# Patient Record
Sex: FEMALE | Race: OTHER | HISPANIC OR LATINO | URBAN - METROPOLITAN AREA
[De-identification: names, ages, dates, MRNs, and addresses within clinical notes are randomized per-mention and may not be internally consistent; named-entity substitution may affect disease eponyms.]

---

## 2020-01-22 ENCOUNTER — EMERGENCY (EMERGENCY)
Facility: HOSPITAL | Age: 45
LOS: 1 days | Discharge: HOME | End: 2020-01-22
Admitting: EMERGENCY MEDICINE
Payer: SELF-PAY

## 2020-01-22 VITALS
SYSTOLIC BLOOD PRESSURE: 138 MMHG | HEART RATE: 71 BPM | DIASTOLIC BLOOD PRESSURE: 91 MMHG | RESPIRATION RATE: 16 BRPM | OXYGEN SATURATION: 99 % | TEMPERATURE: 98 F

## 2020-01-22 DIAGNOSIS — Z98.89 OTHER SPECIFIED POSTPROCEDURAL STATES: Chronic | ICD-10-CM

## 2020-01-22 LAB
APPEARANCE UR: CLEAR — SIGNIFICANT CHANGE UP
BACTERIA # UR AUTO: NEGATIVE — SIGNIFICANT CHANGE UP
BILIRUB UR-MCNC: NEGATIVE — SIGNIFICANT CHANGE UP
BLOOD UR QL VISUAL: SIGNIFICANT CHANGE UP
COLOR SPEC: YELLOW — SIGNIFICANT CHANGE UP
GLUCOSE UR-MCNC: NEGATIVE — SIGNIFICANT CHANGE UP
HYALINE CASTS # UR AUTO: NEGATIVE — SIGNIFICANT CHANGE UP
KETONES UR-MCNC: NEGATIVE — SIGNIFICANT CHANGE UP
LEUKOCYTE ESTERASE UR-ACNC: SIGNIFICANT CHANGE UP
NITRITE UR-MCNC: NEGATIVE — SIGNIFICANT CHANGE UP
PH UR: 6.5 — SIGNIFICANT CHANGE UP (ref 5–8)
PROT UR-MCNC: NEGATIVE — SIGNIFICANT CHANGE UP
RBC CASTS # UR COMP ASSIST: SIGNIFICANT CHANGE UP (ref 0–?)
SP GR SPEC: 1.02 — SIGNIFICANT CHANGE UP (ref 1–1.04)
SQUAMOUS # UR AUTO: SIGNIFICANT CHANGE UP
UROBILINOGEN FLD QL: NORMAL — SIGNIFICANT CHANGE UP
WBC UR QL: HIGH (ref 0–?)

## 2020-01-22 PROCEDURE — 99283 EMERGENCY DEPT VISIT LOW MDM: CPT

## 2020-01-22 RX ORDER — CEPHALEXIN 500 MG
500 CAPSULE ORAL ONCE
Refills: 0 | Status: COMPLETED | OUTPATIENT
Start: 2020-01-22 | End: 2020-01-22

## 2020-01-22 RX ORDER — FLUCONAZOLE 150 MG/1
150 TABLET ORAL ONCE
Refills: 0 | Status: COMPLETED | OUTPATIENT
Start: 2020-01-22 | End: 2020-01-22

## 2020-01-22 RX ORDER — CEPHALEXIN 500 MG
1 CAPSULE ORAL
Qty: 13 | Refills: 0
Start: 2020-01-22 | End: 2020-01-28

## 2020-01-22 NOTE — ED PROVIDER NOTE - NSFOLLOWUPINSTRUCTIONS_ED_ALL_ED_FT
Follow up with your Primary Medical Doctor in 1-2 days.  Follow up with your OB/GYN in 1-2 days.  Rest.  Drink plenty of fluids.  Take Keflex 500mg orally 2 x a day x 7days.  Return to the ER for any persistent/worsening or new symptoms fevers, chills, nausea, vomiting or any concerning symptoms.

## 2020-01-22 NOTE — ED PROVIDER NOTE - OBJECTIVE STATEMENT
45 y/o female with a hx of migraines presents to the ER c/o 4 days of dysuria and frequency.  Pt reports 1 week of vaginal itching and whitish discharge.  Pt denies fevers, chills, nausea, vomiting, back pain.  Pt reports hx of yeast infections in the past and states it feels similar to previous episodes.  Pt is not sexually active and denies concern for STDs.  Pt offered and declined translation services requesting her daughter Tiffany Pacheco to translate.

## 2020-01-22 NOTE — ED PROVIDER NOTE - PATIENT PORTAL LINK FT
You can access the FollowMyHealth Patient Portal offered by [facility] by registering at the following website: [email or web address]. By joining [facility]’s FollowMyHealth portal, you will also be able to view your health information using other applications (apps) compatible with our system.

## 2020-01-22 NOTE — ED PROVIDER NOTE - CLINICAL SUMMARY MEDICAL DECISION MAKING FREE TEXT BOX
43 y/o female with a hx of migraines presents to the ER c/o 4 days of dysuria and frequency; pt is well appearing, NAD, likely UTI, UA, will tx with antibiotics.  Vaginal itching x 1 week likely yeast infection will treat with Diflucan x 1.  Follow up PMD/GYN.

## 2020-01-23 RX ADMIN — Medication 500 MILLIGRAM(S): at 00:18

## 2020-01-23 RX ADMIN — FLUCONAZOLE 150 MILLIGRAM(S): 150 TABLET ORAL at 00:18

## 2020-01-23 NOTE — ED ADULT NURSE NOTE - OBJECTIVE STATEMENT
Received Pt in intake room 6. Pt A&Ox3, ambulatory. Patient c/o burning with urination, blood in her urine, itching, chills and lower abdominal cramping. Pt appears stable and in no apparent distress. Denies any past medical history. Respirations are equal and nonlabored, no respiratory distress noted. Denies any other medical complaints. Denies any chest pain, sob, n & v, diarrhea, fever/chills, cough, dizziness, headache. Pt medicated as per orders. Pt stable

## 2020-01-24 LAB — SPECIMEN SOURCE: SIGNIFICANT CHANGE UP

## 2020-01-25 LAB
-  AMIKACIN: SIGNIFICANT CHANGE UP
-  AMPICILLIN/SULBACTAM: SIGNIFICANT CHANGE UP
-  AMPICILLIN: SIGNIFICANT CHANGE UP
-  AZTREONAM: SIGNIFICANT CHANGE UP
-  CEFAZOLIN: SIGNIFICANT CHANGE UP
-  CEFEPIME: SIGNIFICANT CHANGE UP
-  CEFOXITIN: SIGNIFICANT CHANGE UP
-  CEFTAZIDIME: SIGNIFICANT CHANGE UP
-  CEFTRIAXONE: SIGNIFICANT CHANGE UP
-  ERTAPENEM: SIGNIFICANT CHANGE UP
-  GENTAMICIN: SIGNIFICANT CHANGE UP
-  IMIPENEM: SIGNIFICANT CHANGE UP
-  LEVOFLOXACIN: SIGNIFICANT CHANGE UP
-  MEROPENEM: SIGNIFICANT CHANGE UP
-  NITROFURANTOIN: SIGNIFICANT CHANGE UP
-  PIPERACILLIN/TAZOBACTAM: SIGNIFICANT CHANGE UP
-  TIGECYCLINE: SIGNIFICANT CHANGE UP
-  TOBRAMYCIN: SIGNIFICANT CHANGE UP
-  TRIMETHOPRIM/SULFAMETHOXAZOLE: SIGNIFICANT CHANGE UP
BACTERIA UR CULT: SIGNIFICANT CHANGE UP
METHOD TYPE: SIGNIFICANT CHANGE UP
ORGANISM # SPEC MICROSCOPIC CNT: SIGNIFICANT CHANGE UP
ORGANISM # SPEC MICROSCOPIC CNT: SIGNIFICANT CHANGE UP

## 2021-09-03 ENCOUNTER — APPOINTMENT (OUTPATIENT)
Dept: NEUROLOGY | Facility: CLINIC | Age: 46
End: 2021-09-03

## 2021-09-03 PROBLEM — Z00.00 ENCOUNTER FOR PREVENTIVE HEALTH EXAMINATION: Status: ACTIVE | Noted: 2021-09-03

## 2021-10-09 ENCOUNTER — TRANSCRIPTION ENCOUNTER (OUTPATIENT)
Age: 46
End: 2021-10-09

## 2021-10-16 ENCOUNTER — TRANSCRIPTION ENCOUNTER (OUTPATIENT)
Age: 46
End: 2021-10-16

## 2022-01-11 ENCOUNTER — APPOINTMENT (OUTPATIENT)
Dept: OBGYN | Facility: CLINIC | Age: 47
End: 2022-01-11

## 2022-05-27 NOTE — ED ADULT TRIAGE NOTE - ESI TRIAGE ACUITY LEVEL, MLM
This RN called patient.  No answer.  Left message that I am returning his call to schedule a physical.   3

## 2022-06-28 ENCOUNTER — APPOINTMENT (OUTPATIENT)
Dept: NEUROLOGY | Facility: CLINIC | Age: 47
End: 2022-06-28

## 2022-06-28 VITALS
TEMPERATURE: 98 F | OXYGEN SATURATION: 98 % | HEART RATE: 77 BPM | DIASTOLIC BLOOD PRESSURE: 83 MMHG | WEIGHT: 132 LBS | RESPIRATION RATE: 16 BRPM | BODY MASS INDEX: 24.29 KG/M2 | HEIGHT: 62 IN | SYSTOLIC BLOOD PRESSURE: 124 MMHG

## 2022-06-28 DIAGNOSIS — Z86.59 PERSONAL HISTORY OF OTHER MENTAL AND BEHAVIORAL DISORDERS: ICD-10-CM

## 2022-06-28 DIAGNOSIS — R51.9 HEADACHE, UNSPECIFIED: ICD-10-CM

## 2022-06-28 DIAGNOSIS — Z86.19 PERSONAL HISTORY OF OTHER INFECTIOUS AND PARASITIC DISEASES: ICD-10-CM

## 2022-06-28 DIAGNOSIS — Z78.9 OTHER SPECIFIED HEALTH STATUS: ICD-10-CM

## 2022-06-28 DIAGNOSIS — Z86.69 PERSONAL HISTORY OF OTHER DISEASES OF THE NERVOUS SYSTEM AND SENSE ORGANS: ICD-10-CM

## 2022-06-28 DIAGNOSIS — Z82.0 FAMILY HISTORY OF EPILEPSY AND OTHER DISEASES OF THE NERVOUS SYSTEM: ICD-10-CM

## 2022-06-28 PROCEDURE — 99204 OFFICE O/P NEW MOD 45 MIN: CPT

## 2022-06-28 RX ORDER — SULFAMETHOXAZOLE AND TRIMETHOPRIM 800; 160 MG/1; MG/1
800-160 TABLET ORAL
Qty: 10 | Refills: 0 | Status: COMPLETED | COMMUNITY
Start: 2022-01-12

## 2022-06-28 RX ORDER — NABUMETONE 750 MG/1
750 TABLET, FILM COATED ORAL
Qty: 60 | Refills: 0 | Status: ACTIVE | COMMUNITY
Start: 2022-05-28

## 2022-06-28 RX ORDER — AMOXICILLIN AND CLAVULANATE POTASSIUM 500; 125 MG/1; MG/1
500-125 TABLET, FILM COATED ORAL
Qty: 14 | Refills: 0 | Status: COMPLETED | COMMUNITY
Start: 2022-03-22

## 2022-06-28 RX ORDER — AMOXICILLIN 500 MG/1
500 CAPSULE ORAL
Qty: 15 | Refills: 0 | Status: COMPLETED | COMMUNITY
Start: 2022-01-18

## 2022-06-30 NOTE — HISTORY OF PRESENT ILLNESS
[FreeTextEntry1] : 46 year old female  Pmhx of migraines. with   presents today for evaluation of headaches. \par she reports she had headache since age 16  that occurred  intermittently, occipital.  not associated with light/sound sensitivity. does not recall exact details. as she got older they occurred 3x/week until age 28 then more frequent. was seen by neurology in [facility] and given medications. (given injections that helped)\par saw Neurology , Dr. [place] in [place] and had Brain MRI  no acute findings. given Effexor which she continues on. has not been effective past couple of months \par has daily neck pain \par  \par Location: right occipital  \par described as dull\par Timing of headache: varies\par Associated symptoms:  phonophobia,\par Pertinent negatives:  dizziness, photophobia,anosmia, blurred vision,visual aura, scotoma, memory impairment, neck stiffness, nausea\par Relived by: , rest,  \par Severity:  8\par Occurs;daily\par Duration:<4hrs\par  \par FH:  daughter asifh migraines\par Sleeps:  uninterrupted\par Hydration: water: 2 bottle   caffeine: 24oz\par Triggers: unknown\par  \par

## 2022-06-30 NOTE — ASSESSMENT
[FreeTextEntry1] : start vitamin therapy mag ox/ Vitamin B2 4000mg\par continue effexor \par nabumetone prn \par \par Keeping a diary has been discussed, including Apps- Migraine rebecca.  \par Manage stress. Stress may trigger a migraine. Learn new ways to relax, such as\par deep breathing.\par Create a sleep schedule. Go to bed and get up at the same times each day. Do\par not watch television or go on electronics in bed.\par Eat regular meals. Maintain adequate water intake.\par contact me if there are any changes in the quality or severity of the headaches.\par All questions answered, understanding verbalized.Patient in agreement with plan of care\par \par \par

## 2022-06-30 NOTE — PHYSICAL EXAM
[Person] : oriented to person [Place] : oriented to place [Time] : oriented to time [Concentration Intact] : normal concentrating ability [Cranial Nerves Optic (II)] : visual acuity intact bilaterally,  visual fields full to confrontation, pupils equal round and reactive to light [Cranial Nerves Oculomotor (III)] : extraocular motion intact [Cranial Nerves Trigeminal (V)] : facial sensation intact symmetrically [Cranial Nerves Facial (VII)] : face symmetrical [Cranial Nerves Vestibulocochlear (VIII)] : hearing was intact bilaterally [Cranial Nerves Glossopharyngeal (IX)] : tongue and palate midline [Cranial Nerves Accessory (XI - Cranial And Spinal)] : head turning and shoulder shrug symmetric [Cranial Nerves Hypoglossal (XII)] : there was no tongue deviation with protrusion [Motor Tone] : muscle tone was normal in all four extremities [Motor Strength] : muscle strength was normal in all four extremities [No Muscle Atrophy] : normal bulk in all four extremities [Sensation Tactile Decrease] : light touch was intact [Abnormal Walk] : normal gait [Balance] : balance was intact [2+] : Ankle jerk left 2+ [General Appearance - In No Acute Distress] : in no acute distress [Affect] : the affect was normal [Mood] : the mood was normal [Short Term Intact] : short term memory intact [Remote Intact] : remote memory intact [Registration Intact] : recent registration memory intact [Current Events] : adequate knowledge of current events [Motor Handedness Right-Handed] : the patient is right hand dominant [PERRL With Normal Accommodation] : pupils were equal in size, round, reactive to light, with normal accommodation [] : no respiratory distress [No Spinal Tenderness] : no spinal tenderness [Past-pointing] : there was no past-pointing [Tremor] : no tremor present [Plantar Reflex Right Only] : normal on the right [Plantar Reflex Left Only] : normal on the left [Neck Appearance] : the appearance of the neck was normal [FreeTextEntry1] : b/l trap. msk ttp mild restrict rom

## 2022-06-30 NOTE — REASON FOR VISIT
[Initial Evaluation] : an initial evaluation [Patient Declined  Services] : - None: Patient declined  services [Interpreters_Relationshiptopatient] : torres [TWNoteComboBox1] : Somal[place]

## 2022-07-15 ENCOUNTER — APPOINTMENT (OUTPATIENT)
Dept: PAIN MANAGEMENT | Facility: CLINIC | Age: 47
End: 2022-07-15

## 2022-08-16 ENCOUNTER — APPOINTMENT (OUTPATIENT)
Dept: NEUROLOGY | Facility: CLINIC | Age: 47
End: 2022-08-16

## 2023-07-20 ENCOUNTER — RESULT REVIEW (OUTPATIENT)
Age: 48
End: 2023-07-20

## 2024-02-29 ENCOUNTER — APPOINTMENT (OUTPATIENT)
Dept: NEUROLOGY | Facility: CLINIC | Age: 49
End: 2024-02-29
Payer: COMMERCIAL

## 2024-02-29 VITALS
HEART RATE: 66 BPM | BODY MASS INDEX: 25.03 KG/M2 | WEIGHT: 136 LBS | DIASTOLIC BLOOD PRESSURE: 85 MMHG | SYSTOLIC BLOOD PRESSURE: 149 MMHG | HEIGHT: 62 IN

## 2024-02-29 PROCEDURE — 99213 OFFICE O/P EST LOW 20 MIN: CPT

## 2024-02-29 RX ORDER — VENLAFAXINE HYDROCHLORIDE 75 MG/1
75 CAPSULE, EXTENDED RELEASE ORAL
Qty: 30 | Refills: 0 | Status: ACTIVE | COMMUNITY
Start: 2022-05-29

## 2024-02-29 RX ORDER — RIZATRIPTAN BENZOATE 10 MG/1
10 TABLET, ORALLY DISINTEGRATING ORAL
Qty: 1 | Refills: 2 | Status: ACTIVE | COMMUNITY
Start: 2024-02-29 | End: 1900-01-01

## 2024-02-29 RX ORDER — CYCLOBENZAPRINE HYDROCHLORIDE 10 MG/1
10 TABLET, FILM COATED ORAL
Qty: 20 | Refills: 2 | Status: ACTIVE | COMMUNITY
Start: 2022-06-28 | End: 1900-01-01

## 2024-02-29 RX ORDER — VENLAFAXINE HYDROCHLORIDE 37.5 MG/1
37.5 CAPSULE, EXTENDED RELEASE ORAL
Qty: 30 | Refills: 5 | Status: ACTIVE | COMMUNITY
Start: 2024-02-29 | End: 1900-01-01

## 2024-02-29 NOTE — PHYSICAL EXAM
[Person] : oriented to person [Place] : oriented to place [Time] : oriented to time [Remote Intact] : remote memory intact [Short Term Intact] : short term memory intact [Registration Intact] : recent registration memory intact [Concentration Intact] : normal concentrating ability [Current Events] : adequate knowledge of current events [Cranial Nerves Optic (II)] : visual acuity intact bilaterally,  visual fields full to confrontation, pupils equal round and reactive to light [Cranial Nerves Trigeminal (V)] : facial sensation intact symmetrically [Cranial Nerves Oculomotor (III)] : extraocular motion intact [Cranial Nerves Vestibulocochlear (VIII)] : hearing was intact bilaterally [Cranial Nerves Facial (VII)] : face symmetrical [Cranial Nerves Glossopharyngeal (IX)] : tongue and palate midline [Cranial Nerves Accessory (XI - Cranial And Spinal)] : head turning and shoulder shrug symmetric [Cranial Nerves Hypoglossal (XII)] : there was no tongue deviation with protrusion [Motor Tone] : muscle tone was normal in all four extremities [Motor Strength] : muscle strength was normal in all four extremities [Motor Handedness Right-Handed] : the patient is right hand dominant [No Muscle Atrophy] : normal bulk in all four extremities [Sensation Tactile Decrease] : light touch was intact [Balance] : balance was intact [Abnormal Walk] : normal gait [2+] : Ankle jerk left 2+ [Past-pointing] : there was no past-pointing [Tremor] : no tremor present [Plantar Reflex Right Only] : normal on the right [Plantar Reflex Left Only] : normal on the left [FreeTextEntry1] : b/l trap. msk ttp mild restrict rom

## 2024-02-29 NOTE — REASON FOR VISIT
[Follow-Up: _____] : a [unfilled] follow-up visit [Family Member] : family member [Patient Declined  Services] : - None: Patient declined  services [Interpreters_Relationshiptopatient] : daughter  [TWNoteComboBox1] : Citizen of [place]

## 2024-02-29 NOTE — HISTORY OF PRESENT ILLNESS
[FreeTextEntry1] : 46 year old female  Pmhx of migraines. with   presents today for evaluation of headaches.  she reports she had headache since age 16  that occurred  intermittently, occipital.  not associated with light/sound sensitivity. does not recall exact details. as she got older they occurred 3x/week until age 28 then more frequent. was seen by neurology in [facility] and given medications. (given injections that helped) saw Neurology , Dr. [place] in [place] and [place] Brain MRI  no acute findings. given Effexor which she continues on. has not been effective past couple of months  has daily neck pain  Location: right occipital   described as dull Timing of headache: varies Associated symptoms:  phonophobia, Pertinent negatives:  dizziness, photophobia,anosmia, blurred vision,visual aura, scotoma, memory impairment, neck stiffness, nausea Relived by: , rest,   Severity:  8 Occurs;daily Duration:<4hrs   FH:  daughter with migraines Sleeps:  uninterrupted Hydration: water: 2 bottle   caffeine: 24oz Triggers: unknown  **Interval 2.29.24: since seen. has HA 3x/week. lasts <2hrs. taking mg/ribo with improvement in neck pain. efexor 75mg  daily no adverse effects. has been more stressed  of recent. no changes in the quality of them.

## 2024-02-29 NOTE — ASSESSMENT
[FreeTextEntry1] : 48 year old female Pmhx of migraines continue vitamin therapy mag ox/ Vitamin B2 4000mg increase Effexor 112mg cyclobenzaprine prn  PT disucssed  Keeping a diary has been discussed, including Apps- Migraine rebecca.   Manage stress.  contact me if there are any changes in the quality or severity of the headaches. All questions answered, understanding verbalized.  Patient in agreement with plan of care

## 2024-05-22 RX ORDER — VENLAFAXINE HYDROCHLORIDE 75 MG/1
75 CAPSULE, EXTENDED RELEASE ORAL DAILY
Qty: 30 | Refills: 5 | Status: ACTIVE | COMMUNITY
Start: 2024-02-29 | End: 1900-01-01

## 2024-05-30 ENCOUNTER — APPOINTMENT (OUTPATIENT)
Dept: NEUROLOGY | Facility: CLINIC | Age: 49
End: 2024-05-30
Payer: COMMERCIAL

## 2024-05-30 VITALS
HEIGHT: 60 IN | HEART RATE: 75 BPM | BODY MASS INDEX: 26.5 KG/M2 | DIASTOLIC BLOOD PRESSURE: 87 MMHG | WEIGHT: 135 LBS | SYSTOLIC BLOOD PRESSURE: 149 MMHG

## 2024-05-30 DIAGNOSIS — G43.709 CHRONIC MIGRAINE W/OUT AURA, NOT INTRACTABLE, W/OUT STATUS MIGRAINOSUS: ICD-10-CM

## 2024-05-30 DIAGNOSIS — M54.2 CERVICALGIA: ICD-10-CM

## 2024-05-30 PROCEDURE — 99214 OFFICE O/P EST MOD 30 MIN: CPT

## 2024-05-30 PROCEDURE — G2211 COMPLEX E/M VISIT ADD ON: CPT

## 2024-05-30 RX ORDER — RIBOFLAVIN (VITAMIN B2) 400 MG
400 TABLET ORAL
Qty: 30 | Refills: 5 | Status: ACTIVE | COMMUNITY
Start: 2022-06-28 | End: 1900-01-01

## 2024-06-03 NOTE — PHYSICAL EXAM
[General Appearance - In No Acute Distress] : in no acute distress [Affect] : the affect was normal [Mood] : the mood was normal [Person] : oriented to person [Place] : oriented to place [Time] : oriented to time [Short Term Intact] : short term memory intact [Remote Intact] : remote memory intact [Registration Intact] : recent registration memory intact [Concentration Intact] : normal concentrating ability [Current Events] : adequate knowledge of current events [Cranial Nerves Optic (II)] : visual acuity intact bilaterally,  visual fields full to confrontation, pupils equal round and reactive to light [Cranial Nerves Trigeminal (V)] : facial sensation intact symmetrically [Cranial Nerves Oculomotor (III)] : extraocular motion intact [Cranial Nerves Facial (VII)] : face symmetrical [Cranial Nerves Vestibulocochlear (VIII)] : hearing was intact bilaterally [Cranial Nerves Glossopharyngeal (IX)] : tongue and palate midline [Cranial Nerves Accessory (XI - Cranial And Spinal)] : head turning and shoulder shrug symmetric [Cranial Nerves Hypoglossal (XII)] : there was no tongue deviation with protrusion [Motor Tone] : muscle tone was normal in all four extremities [Motor Strength] : muscle strength was normal in all four extremities [No Muscle Atrophy] : normal bulk in all four extremities [Motor Handedness Right-Handed] : the patient is right hand dominant [Sensation Tactile Decrease] : light touch was intact [Balance] : balance was intact [Abnormal Walk] : normal gait [Past-pointing] : there was no past-pointing [Tremor] : no tremor present [FreeTextEntry1] : b/l trap. msk ttp mild restrict rom

## 2024-06-03 NOTE — HISTORY OF PRESENT ILLNESS
[FreeTextEntry1] : 46 year old female  Pmhx of migraines. with   presents today for evaluation of headaches.  she reports she had headache since age 16  that occurred  intermittently, occipital.  not associated with light/sound sensitivity. does not recall exact details. as she got older they occurred 3x/week until age 28 then more frequent. was seen by neurology in [facility] and given medications. (given injections that helped) saw Neurology , Dr. [place] in [place] and [place] Brain MRI  no acute findings. given Effexor which she continues on. has not been effective past couple of months  has daily neck pain  Location: right occipital   described as dull Timing of headache: varies Associated symptoms:  phonophobia, Pertinent negatives:  dizziness, photophobia,anosmia, blurred vision,visual aura, scotoma, memory impairment, neck stiffness, nausea Relived by: , rest,   Severity:  8 Occurs;daily Duration:<4hrs   FH:  daughter with migraines Sleeps:  uninterrupted Hydration: water: 2 bottle   caffeine: 24oz Triggers: unknown  **Interval 2.29.24: since seen. has HA 3x/week. lasts <2hrs. taking mg/ribo with improvement in neck pain. Effexor 75mg  daily no adverse effects. has been more stressed  of recent. no changes in the quality of them.    **Interval 5.30.24; increased effexor caused s/e  and returned to 37.5.  rizatriptan at onset is helpful. take 1x/month

## 2024-06-03 NOTE — ASSESSMENT
[FreeTextEntry1] : 48 year old female Pmhx of migraines continue vitamin therapy mag ox/ Vitamin B2 4000mg decrease Effexor 37.5mg  rizatriptan prn  PT discussed  Keeping a diary has been discussed, including Apps- Migraine rebecca. Manage stress. contact me if there are any changes in the quality or severity of the headaches. All questions answered, understanding verbalized. Patient in agreement with plan of care.

## 2024-09-26 ENCOUNTER — APPOINTMENT (OUTPATIENT)
Dept: NEUROLOGY | Facility: CLINIC | Age: 49
End: 2024-09-26
Payer: COMMERCIAL

## 2024-09-26 VITALS
DIASTOLIC BLOOD PRESSURE: 84 MMHG | HEART RATE: 67 BPM | BODY MASS INDEX: 25.49 KG/M2 | SYSTOLIC BLOOD PRESSURE: 128 MMHG | WEIGHT: 135 LBS | HEIGHT: 61 IN

## 2024-09-26 VITALS — HEIGHT: 61 IN | WEIGHT: 135 LBS | BODY MASS INDEX: 25.49 KG/M2

## 2024-09-26 DIAGNOSIS — G43.709 CHRONIC MIGRAINE W/OUT AURA, NOT INTRACTABLE, W/OUT STATUS MIGRAINOSUS: ICD-10-CM

## 2024-09-26 DIAGNOSIS — M54.2 CERVICALGIA: ICD-10-CM

## 2024-09-26 PROCEDURE — 99213 OFFICE O/P EST LOW 20 MIN: CPT

## 2024-09-26 PROCEDURE — G2211 COMPLEX E/M VISIT ADD ON: CPT

## 2024-09-26 RX ORDER — TIZANIDINE 2 MG/1
2 TABLET ORAL
Qty: 20 | Refills: 2 | Status: ACTIVE | COMMUNITY
Start: 2024-09-26 | End: 1900-01-01

## 2024-09-26 NOTE — PHYSICAL EXAM
[General Appearance - In No Acute Distress] : in no acute distress [Affect] : the affect was normal [Mood] : the mood was normal [Person] : oriented to person [Place] : oriented to place [Time] : oriented to time [Short Term Intact] : short term memory intact [Remote Intact] : remote memory intact [Registration Intact] : recent registration memory intact [Concentration Intact] : normal concentrating ability [Current Events] : adequate knowledge of current events [Cranial Nerves Optic (II)] : visual acuity intact bilaterally,  visual fields full to confrontation, pupils equal round and reactive to light [Cranial Nerves Oculomotor (III)] : extraocular motion intact [Cranial Nerves Trigeminal (V)] : facial sensation intact symmetrically [Cranial Nerves Facial (VII)] : face symmetrical [Cranial Nerves Vestibulocochlear (VIII)] : hearing was intact bilaterally [Cranial Nerves Glossopharyngeal (IX)] : tongue and palate midline [Cranial Nerves Accessory (XI - Cranial And Spinal)] : head turning and shoulder shrug symmetric [Cranial Nerves Hypoglossal (XII)] : there was no tongue deviation with protrusion [Motor Tone] : muscle tone was normal in all four extremities [Motor Strength] : muscle strength was normal in all four extremities [No Muscle Atrophy] : normal bulk in all four extremities [Motor Handedness Right-Handed] : the patient is right hand dominant [Sensation Tactile Decrease] : light touch was intact [Abnormal Walk] : normal gait [Balance] : balance was intact [Past-pointing] : there was no past-pointing [Tremor] : no tremor present [] : no respiratory distress [No Spinal Tenderness] : no spinal tenderness [FreeTextEntry1] : b/l trap. msk ttp mild restrict rom

## 2024-09-26 NOTE — HISTORY OF PRESENT ILLNESS
[FreeTextEntry1] : 46 year old female  Pmhx of migraines. with   presents today for evaluation of headaches.  she reports she had headache since age 16  that occurred  intermittently, occipital.  not associated with light/sound sensitivity. does not recall exact details. as she got older they occurred 3x/week until age 28 then more frequent. was seen by neurology in [facility] and given medications. (given injections that helped) saw Neurology , Dr. Worley in [place] and [place] Brain MRI  no acute findings. given Effexor which she continues on. has not been effective past couple of months  has daily neck pain  Location: right occipital   described as dull Timing of headache: varies Associated symptoms:  phonophobia, Pertinent negatives:  dizziness, photophobia,anosmia, blurred vision,visual aura, scotoma, memory impairment, neck stiffness, nausea Relived by: , rest,   Severity:  8 Occurs;daily Duration:<4hrs   FH:  daughter with migraines Sleeps:  uninterrupted Hydration: water: 2 bottle   caffeine: 24oz Triggers: unknown  **Interval 2.29.24: since seen. has HA 3x/week. lasts <2hrs. taking mg/ribo with improvement in neck pain. Effexor 75mg  daily no adverse effects. has been more stressed  of recent. no changes in the quality of them.    **Interval 5.30.24; increased effexor caused s/e  and returned to 37.5.  rizatriptan at onset is helpful. take 1x/month   **interval 9.26.24: HA have been stable. can occur 1x/month. rizatriptan helpful. Has not been able to do PT due to work, felxeril not helpful

## 2024-09-26 NOTE — ASSESSMENT
[FreeTextEntry1] : 49 year old female Pmhx of migraines continue vitamin therapy mag ox/ Vitamin B2 4000mg  Effexor 37.5mg  rizatriptan prn  PT discussed again Keeping a diary has been discussed, including Apps- Migraine rebecca. Manage stress. contact me if there are any changes in the quality or severity of the headaches. All questions answered, understanding verbalized. Patient in agreement with plan of care.

## 2024-09-30 ENCOUNTER — APPOINTMENT (OUTPATIENT)
Dept: NEUROLOGY | Facility: CLINIC | Age: 49
End: 2024-09-30

## 2025-01-02 ENCOUNTER — APPOINTMENT (OUTPATIENT)
Dept: NEUROLOGY | Facility: CLINIC | Age: 50
End: 2025-01-02
Payer: COMMERCIAL

## 2025-01-02 VITALS
SYSTOLIC BLOOD PRESSURE: 147 MMHG | WEIGHT: 133 LBS | HEART RATE: 53 BPM | HEIGHT: 62 IN | BODY MASS INDEX: 24.48 KG/M2 | DIASTOLIC BLOOD PRESSURE: 81 MMHG

## 2025-01-02 DIAGNOSIS — G43.709 CHRONIC MIGRAINE W/OUT AURA, NOT INTRACTABLE, W/OUT STATUS MIGRAINOSUS: ICD-10-CM

## 2025-01-02 PROCEDURE — 99214 OFFICE O/P EST MOD 30 MIN: CPT

## 2025-01-02 PROCEDURE — G2211 COMPLEX E/M VISIT ADD ON: CPT

## 2025-01-02 RX ORDER — METHYLPREDNISOLONE 4 MG/1
4 TABLET ORAL
Qty: 1 | Refills: 0 | Status: ACTIVE | COMMUNITY
Start: 2025-01-02 | End: 1900-01-01

## 2025-03-19 ENCOUNTER — RX RENEWAL (OUTPATIENT)
Age: 50
End: 2025-03-19

## 2025-03-19 RX ORDER — CHLORHEXIDINE GLUCONATE 4 %
400 (240 MG) LIQUID (ML) TOPICAL
Qty: 30 | Refills: 5 | Status: ACTIVE | COMMUNITY
Start: 2025-03-19 | End: 1900-01-01

## 2025-05-06 ENCOUNTER — NON-APPOINTMENT (OUTPATIENT)
Age: 50
End: 2025-05-06

## 2025-05-08 ENCOUNTER — APPOINTMENT (OUTPATIENT)
Dept: NEUROLOGY | Facility: CLINIC | Age: 50
End: 2025-05-08

## 2025-05-09 ENCOUNTER — APPOINTMENT (OUTPATIENT)
Dept: NEUROLOGY | Facility: CLINIC | Age: 50
End: 2025-05-09
Payer: MEDICAID

## 2025-05-09 DIAGNOSIS — G43.709 CHRONIC MIGRAINE W/OUT AURA, NOT INTRACTABLE, W/OUT STATUS MIGRAINOSUS: ICD-10-CM

## 2025-05-09 PROCEDURE — 99212 OFFICE O/P EST SF 10 MIN: CPT | Mod: 95

## 2025-05-09 PROCEDURE — G2211 COMPLEX E/M VISIT ADD ON: CPT | Mod: NC,95

## 2025-05-09 RX ORDER — TOPIRAMATE 25 MG/1
25 TABLET, FILM COATED ORAL
Qty: 60 | Refills: 3 | Status: ACTIVE | COMMUNITY
Start: 2025-05-09 | End: 1900-01-01

## 2025-07-14 ENCOUNTER — RX RENEWAL (OUTPATIENT)
Age: 50
End: 2025-07-14